# Patient Record
Sex: MALE | Race: WHITE | NOT HISPANIC OR LATINO
[De-identification: names, ages, dates, MRNs, and addresses within clinical notes are randomized per-mention and may not be internally consistent; named-entity substitution may affect disease eponyms.]

---
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
## 2024-01-17 ENCOUNTER — APPOINTMENT (RX ONLY)
Dept: URBAN - METROPOLITAN AREA CLINIC 4 | Facility: CLINIC | Age: 19
Setting detail: DERMATOLOGY
End: 2024-01-17

## 2024-01-17 DIAGNOSIS — L98429 CHRONIC ULCER OF OTHER SPECIFIED SITES: ICD-10-CM | Status: INADEQUATELY CONTROLLED

## 2024-01-17 DIAGNOSIS — L98419 CHRONIC ULCER OF OTHER SPECIFIED SITES: ICD-10-CM | Status: INADEQUATELY CONTROLLED

## 2024-01-17 PROBLEM — L98.499 NON-PRESSURE CHRONIC ULCER OF SKIN OF OTHER SITES WITH UNSPECIFIED SEVERITY: Status: ACTIVE | Noted: 2024-01-17

## 2024-01-17 PROCEDURE — ? PRESCRIPTION MEDICATION MANAGEMENT

## 2024-01-17 PROCEDURE — ? COUNSELING

## 2024-01-17 PROCEDURE — ? DIAGNOSIS COMMENT

## 2024-01-17 PROCEDURE — 99213 OFFICE O/P EST LOW 20 MIN: CPT

## 2024-01-17 PROCEDURE — ? PHOTO-DOCUMENTATION

## 2024-01-17 PROCEDURE — ? PRESCRIPTION

## 2024-01-17 PROCEDURE — ? ORDER TESTS

## 2024-01-17 RX ORDER — DOXYCYCLINE HYCLATE 100 MG/1
1 CAPSULE, GELATIN COATED ORAL BID
Qty: 28 | Refills: 0 | Status: ERX | COMMUNITY
Start: 2024-01-17

## 2024-01-17 RX ADMIN — DOXYCYCLINE HYCLATE 1: 100 CAPSULE, GELATIN COATED ORAL at 00:00

## 2024-01-17 ASSESSMENT — LOCATION SIMPLE DESCRIPTION DERM: LOCATION SIMPLE: NECK

## 2024-01-17 ASSESSMENT — LOCATION DETAILED DESCRIPTION DERM: LOCATION DETAILED: RIGHT CENTRAL LATERAL NECK

## 2024-01-17 ASSESSMENT — LOCATION ZONE DERM: LOCATION ZONE: NECK

## 2024-01-17 NOTE — PROCEDURE: DIAGNOSIS COMMENT
Comment: Patient report started as bump underneath skin that became inflamed and he picked at. Patient reports he did express \"pimple like\" material from lesion.  Patient reports became inflamed around Christmas (mom has pic with bandaid covering it on Maureen Day).  Patient denies any other lesions on his body. Patient denies exposure to local waterways.\\n\\nPatient reports he saw oral surgeon yesterday for scheduled wisdom teeth extraction, extraction rescheduled due to ulcer on neck.\\n\\nDiscussed differential diagnosis of inflamed cyst vs MRSA infection.  Patient reports opening has been growing slowly.  \\n\\nPatient advised to STOP clindamycin (has only taken 1 pill) (hold on to RX), begin Doxycycline today, BID. Patient and mom voiced understanding.
Render Risk Assessment In Note?: no
Detail Level: Zone

## 2024-01-17 NOTE — HPI: SKIN LESIONS
Have Your Skin Lesions Been Treated?: not been treated
Is This A New Presentation, Or A Follow-Up?: Skin Lesion
Additional History: Oral surgeon gave po antibiotics Clindamycin 300 1 po q 6hrs x 10 days, patient took one pill last night. Patient feels it started out as a pimple or ingrown hair before Maureen. He has attempted extracting. Lesion started to open up about a week ago per patient report. Lesion is open, painful, has yellowish discharge. Patient denies fever or chills. Patient denies exposure to local waterways.  Patient reports one time use of hot tub on New Years.

## 2024-01-17 NOTE — PROCEDURE: ORDER TESTS
Performing Laboratory: [id number]
Billing Type: Third-Party Bill
Expected Date Of Service: 01/17/2024
Lab Facility: [id number]
Bill For Surgical Tray: no
Clinical Notes (To The Lab): Culture and sensitivity
[Negative] : Heme/Lymph

## 2024-01-17 NOTE — PROCEDURE: PRESCRIPTION MEDICATION MANAGEMENT
Detail Level: Zone
Plan: If develops fever or chills, patient advised to go to ER.
Initiate Treatment: Wash area with gentle cleanser (new clean towel and wash cloth each time)\\n Doxycycline 100 mg 1 po bid x 2 weeks\\nMupirocin qd-bid (patient has at home)
Discontinue Regimen: Clindamycin 300 PO (took 1 pill yesterday - from oral surgeon)\\nOTC Bactroban
Modify Regimen: Do not take the same time as nexium\\nAvoid regular bandaids
Render In Strict Bullet Format?: No

## 2024-01-18 ENCOUNTER — RX ONLY (OUTPATIENT)
Age: 19
Setting detail: RX ONLY
End: 2024-01-18

## 2024-01-18 RX ORDER — MUPIROCIN 20 MG/G
1 OINTMENT TOPICAL BID
Qty: 22 | Refills: 2 | Status: ERX | COMMUNITY
Start: 2024-01-18

## 2024-01-22 ENCOUNTER — APPOINTMENT (RX ONLY)
Dept: URBAN - METROPOLITAN AREA CLINIC 4 | Facility: CLINIC | Age: 19
Setting detail: DERMATOLOGY
End: 2024-01-22

## 2024-01-22 DIAGNOSIS — L259 CONTACT DERMATITIS AND OTHER ECZEMA, UNSPECIFIED CAUSE: ICD-10-CM

## 2024-01-22 DIAGNOSIS — L08.9 LOCAL INFECTION OF THE SKIN AND SUBCUTANEOUS TISSUE, UNSPECIFIED: ICD-10-CM | Status: IMPROVED

## 2024-01-22 PROBLEM — L23.9 ALLERGIC CONTACT DERMATITIS, UNSPECIFIED CAUSE: Status: ACTIVE | Noted: 2024-01-22

## 2024-01-22 PROCEDURE — ? PHOTO-DOCUMENTATION

## 2024-01-22 PROCEDURE — 99213 OFFICE O/P EST LOW 20 MIN: CPT

## 2024-01-22 PROCEDURE — ? COUNSELING

## 2024-01-22 PROCEDURE — ? PRESCRIPTION MEDICATION MANAGEMENT

## 2024-01-22 PROCEDURE — ? TREATMENT REGIMEN

## 2024-01-22 PROCEDURE — ? DIAGNOSIS COMMENT

## 2024-01-22 ASSESSMENT — LOCATION ZONE DERM: LOCATION ZONE: NECK

## 2024-01-22 ASSESSMENT — LOCATION SIMPLE DESCRIPTION DERM: LOCATION SIMPLE: NECK

## 2024-01-22 ASSESSMENT — ITCH NUMERIC RATING SCALE: HOW SEVERE IS YOUR ITCHING?: 0

## 2024-01-22 ASSESSMENT — LOCATION DETAILED DESCRIPTION DERM: LOCATION DETAILED: RIGHT CENTRAL LATERAL NECK

## 2024-01-22 NOTE — PROCEDURE: DIAGNOSIS COMMENT
Comment: Preliminary culture reviewed with patient and mother. Showing overgrowth of staphylococcus aureus, mom advised we will call if final report changes from prelim.  Sensitive to Doxycycline and resistant to Clindamycin.  Patient advised to NOT resume clindamycin (has only taken 1 pill) (hold on to RX)from oral surgeon. Patient and mom voiced understanding. Much improved from last visit.
Render Risk Assessment In Note?: no
Detail Level: Zone

## 2024-01-22 NOTE — PROCEDURE: PRESCRIPTION MEDICATION MANAGEMENT
Detail Level: Zone
Initiate Treatment: Vaseline or Aquaphor BID\\nPapertape and guaze to cover
Modify Regimen: Do not take the same time as nexium\\n\\nAvoid regular bandaids due to irritation
Continue Regimen: Finish remaining Doxycycline 100 mg 1 po bid for a total of 14 days\\nWash area with gentle cleanser (new clean towel and wash cloth each time)\\nMupirocin qd-bid
Render In Strict Bullet Format?: No

## 2024-01-22 NOTE — PROCEDURE: TREATMENT REGIMEN
Continue Regimen: Mupirocin qd-bid
Detail Level: Zone
Initiate Treatment: Gauze and paper tape
Discontinue Regimen: Tegaderm

## 2024-01-22 NOTE — PROCEDURE: PHOTO-DOCUMENTATION
Photo Preface (Leave Blank If You Do Not Want): Photographs were obtained today
Detail Level: Zone
no

## 2024-02-14 ENCOUNTER — APPOINTMENT (RX ONLY)
Dept: URBAN - METROPOLITAN AREA CLINIC 4 | Facility: CLINIC | Age: 19
Setting detail: DERMATOLOGY
End: 2024-02-14

## 2024-02-14 DIAGNOSIS — B07.8 OTHER VIRAL WARTS: ICD-10-CM

## 2024-02-14 DIAGNOSIS — L08.9 LOCAL INFECTION OF THE SKIN AND SUBCUTANEOUS TISSUE, UNSPECIFIED: ICD-10-CM | Status: RESOLVED

## 2024-02-14 DIAGNOSIS — L90.5 SCAR CONDITIONS AND FIBROSIS OF SKIN: ICD-10-CM

## 2024-02-14 PROCEDURE — 99213 OFFICE O/P EST LOW 20 MIN: CPT

## 2024-02-14 PROCEDURE — ? DIAGNOSIS COMMENT

## 2024-02-14 PROCEDURE — ? DEFER

## 2024-02-14 PROCEDURE — ? COUNSELING

## 2024-02-14 ASSESSMENT — LOCATION DETAILED DESCRIPTION DERM
LOCATION DETAILED: LEFT RADIAL PALM
LOCATION DETAILED: LEFT RADIAL PALM
LOCATION DETAILED: RIGHT MID RADIAL DORSAL INDEX FINGER
LOCATION DETAILED: RIGHT CENTRAL LATERAL NECK
LOCATION DETAILED: RIGHT MID RADIAL DORSAL INDEX FINGER

## 2024-02-14 ASSESSMENT — LOCATION SIMPLE DESCRIPTION DERM
LOCATION SIMPLE: LEFT HAND
LOCATION SIMPLE: NECK
LOCATION SIMPLE: LEFT HAND
LOCATION SIMPLE: RIGHT INDEX FINGER
LOCATION SIMPLE: RIGHT INDEX FINGER

## 2024-02-14 ASSESSMENT — LOCATION ZONE DERM
LOCATION ZONE: NECK
LOCATION ZONE: HAND
LOCATION ZONE: FINGER
LOCATION ZONE: HAND
LOCATION ZONE: FINGER

## 2024-02-14 NOTE — PROCEDURE: DIAGNOSIS COMMENT
Detail Level: Zone
Render Risk Assessment In Note?: no
Comment: Resolved with PO doxycycline. Culture proven overgrowth of staph. Aureus on 1/17/2024.  Discussed excision with plastics if recurs. Patient to RTC if recurs.

## 2024-02-14 NOTE — PROCEDURE: DEFER
Size Of Lesion In Cm (Optional): 0
Introduction Text (Please End With A Colon): The following procedure was deferred
Instructions (Optional): Defers LN2 at this time - Opts to use WartStick
Detail Level: Detailed

## 2024-11-01 NOTE — PROCEDURE: PRESCRIPTION MEDICATION MANAGEMENT
Spontaneous, unlabored and symmetrical
Detail Level: Zone
Render In Strict Bullet Format?: No
Initiate Treatment: 40%SA stick QHS under tape